# Patient Record
Sex: MALE | Race: WHITE | Employment: OTHER | URBAN - METROPOLITAN AREA
[De-identification: names, ages, dates, MRNs, and addresses within clinical notes are randomized per-mention and may not be internally consistent; named-entity substitution may affect disease eponyms.]

---

## 2021-11-04 ENCOUNTER — HOSPITAL ENCOUNTER (OUTPATIENT)
Dept: PHYSICAL THERAPY | Age: 61
Discharge: HOME | End: 2021-11-04
Payer: MEDICARE

## 2021-11-04 PROCEDURE — 97162 PT EVAL MOD COMPLEX 30 MIN: CPT

## 2021-11-04 PROCEDURE — 97530 THERAPEUTIC ACTIVITIES: CPT

## 2021-11-04 NOTE — PROGRESS NOTES
PT DAILY TREATMENT NOTE/NEURO EVAL 10-18    Patient Name: Shawn Barraza  Date:2021  : 1960  [x]  Patient  Verified  Payor: Myla Hudson / Plan: [address] [place] [address] HMO / Product Type: Managed Care Medicare /    In time:245  Out time:330  Total Treatment Time (min): 45  Visit #: 1 of 12    Medicare/BCBS Only   Total Timed Codes (min):  15 1:1 Treatment Time:  30     Treatment Area: Other abnormalities of gait and mobility [R26.89]    SUBJECTIVE  Pain Level (0-10 scale): 3/10   [x]constant [x]intermittent []improving []worsening []no change since onset    Any medication changes, allergies to medications, adverse drug reactions, diagnosis change, or new procedure performed?: [x] No    [] Yes (see summary sheet for update)  Subjective functional status/changes:     PLOF: Pt reports PLOF was ambulatory   Limitations to PLOF:  Pt report drop foot, limiting your ability to participate in his daily work out. Likes to go hunting. Mechanism of Injury: Pt reports that he has MS and has drop foot in Right foot. Pt has an AFO that is hinged and bulky. Pt reports that he needs a new AFO. Pt reports that his legs have been giving out on him and his legs get tired really fast and are giving out on him. Pt has an infusion at the end of the month. Pt reports that he has stumbled about 2-3 x a week mostly from catching his toe on stuff. Current symptoms/Complaints: Pt report drop foot, limiting your ability to participate in his daily work out. Previous Treatment/Compliance: Previous AFO, Infusions, gabapentin, aleve   PMHx/Surgical Hx: Partial TKA Right; Lumbar surgery, 2 budging discs L 3-5?;    Work Hx: Disabled. Living Situation: Lives in a 1 story home with no RAFY.     Pt Goals: Want to see if he can strengthen his legs if he could  Barriers: []pain []financial []time []transportation []other  Motivation: good   Substance use: []Alcohol []Tobacco []other:   Cognition: A & O x 3 Other: OBJECTIVE     30 min [x]Eval                  []Re-Eval     15 min Therapeutic Activity:  []  See flow sheet : tone and self management at home, MS symptom exacerbation   Rationale: education  to improve the patients ability to manage MS symptoms         With   [] TE   [x] TA   [] neuro   [] other: Patient Education: [x] Review HEP    [x] Progressed/Changed HEP based on:   [x] positioning   [x] body mechanics   [x] transfers   [] heat/ice application    [] other:        General Evaluation    Posture: [] Poor    [x] Fair    [] Good    Describe:    Slightly protracted shoulders slightly increased upper traps, good pelvic alignment. Seated resting posture Pt sits in hip internal rotation and plantar flexed on Right LE. Gait: [] Normal    [x] Abnormal    Device:  None     Description: slight IR and knee extension     ROM/Strength           AROM                         PROM          Strength (1-5)    Lower Extremity/Lumbar Left  (0-5) Right (0-5)   Hip Flexion (L1,2) 4 4   Knee Extension (L3,4) 5 5   Ankle Dorsiflexion (L4) 4 4   Hip extension  3+ 3+   Knee Flexion (S1,2) 4 4   Hip Abduction 3+ 3+     Tone: Clonus  Bala Ankles     Sensation: Pt reports that he is tingling from knees down Bala, intermittent numbness in legs and hands that go numb so he cant use them    Balance/ Equilibrium:         Single Leg Stance:         Eyes Open  Eyes Closed   Left 20 Left    Right 15 Right       Other test /comments:  TU.8 sec   MiniBESTest:     Pain Level (0-10 scale) post treatment: 3/10    ASSESSMENT/Changes in Function: 60 yo male who [facility] to [facility] with c/o unsteadiness on feet. Patient reports referral to therapy due to need for a new AFO for Right LE. Pt reports that he has MS and has drop foot in Right foot. Pt has an AFO that is hinged and bulky. Pt reports that his legs have been giving out on him and his legs get tired really fast and are giving out on him more frequently.   Pt has an infusion at the end of the month and reports that his symptoms increase when he nears his infusion. Pt reports that he has stumbled about 2-3 x a week mostly from catching his toe on stuff. Patient demonstrates decreased ROM, decreased strength, impaired posture, pain, impaired gait mechanics, impaired balance and stabitliy, and decreased functional mobility tolerance. Patient will continue to benefit from skilled PT services to modify and progress therapeutic interventions, address functional mobility deficits, address ROM deficits, address strength deficits, analyze and cue movement patterns, analyze and modify body mechanics/ergonomics, assess and modify postural abnormalities, address imbalance/dizziness and instruct in home and community integration to attain remaining goals. [x]  See Plan of Care  []  See progress note/recertification  []  See Discharge Summary         Progress towards goals / Updated goals:  Short Term Goals: To be accomplished in 2 weeks:  1. Patient will report compliance with HEP at least 1x/day to aid in rehabilitation program.  Status at IE: To be given at visit 2  Current:    2. Patient will be able to stand on toes 3 seconds at full ROM to decreased falls risk. Status at IE: partial ROM for 3 min  Current:       Long Term Goals: To be accomplished in 6 weeks:  1. Patient will increase strength to 5/5 throughout Bala LEs to aid in return recreational activities and ADLs. Status at IE:   Lower Extremity/Lumbar Left  (0-5) Right (0-5)   Hip Flexion (L1,2) 4 4   Knee Extension (L3,4) 5 5   Ankle Dorsiflexion (L4) 4 4   Hip extension  3+ 3+   Knee Flexion (S1,2) 4 4   Hip Abduction 3+ 3+     Current:    2. Patient will improve minibestest score to 25/28 to demonstrate decreased risk for falls. Status at IE: 19/28  Current:    3. Patient will ambulate 1000ft on level surface with normalized gait over even and uneven terrain with no LOB.   Status at [facility] [address] internal rotation of hip with plantar flexion on right during gait   Current:    4. Patient will improve FOTO by full points of change overall to demonstrate improvement in functional ability.   Status at IE:to be taken at visit 2   FOTO score = an established functional score where 100 = no disability  Current:    PLAN  []  Upgrade activities as tolerated     [x]  Continue plan of care  []  Update interventions per flow sheet       []  Discharge due to:_  []  Other:_      Kacy Gates, PT 11/4/2021  10:51 AM

## 2021-11-04 NOTE — PROGRESS NOTES
[facility]  [address] Merly Carlos, [address]  Ph [phone]  Fx [phone]    Plan of Care/ Statement of Necessity for Physical Therapy Services    Patient name: Batshvea Garza Start of Care: 2021   Referral source: Rossy oTscano : 1960    Medical Diagnosis: Other abnormalities of gait and mobility [R26.89]   Onset Date:>1 year     Treatment Diagnosis: Other abnormalities of gait and mobility                                              ICD-10: R26.89   Prior Hospitalization: see medical history Provider#: [id number]   Medications: Verified on Patient summary List    Comorbidities:  Partial TKA Right; Lumbar surgery, 2 budging discs L 3-5?;     Prior Level of Function: ambulatory going to the gym and hunting; no AD       The Plan of Care and following information is based on the information from the initial evaluation. Assessment/ key information: 58 yo male who presents to [facility] with c/o unsteadiness on feet. Patient reports referral to therapy due to need for a new AFO for Right LE. Pt reports that he has MS and has drop foot in Right foot. Pt has an AFO that is hinged and bulky. Pt reports that his legs have been giving out on him and his legs get tired really fast and are giving out on him more frequently. Pt has an infusion at the end of the month and reports that his symptoms increase when he nears his infusion. Pt reports that he has stumbled about 2-3 x a week mostly from catching his toe on stuff.   Patient demonstrates decreased ROM, decreased strength, impaired posture, pain, impaired gait mechanics, impaired balance and stabitliy, and decreased functional mobility tolerance.     Patient will continue to benefit from skilled PT services to modify and progress therapeutic interventions, address functional mobility deficits, address ROM deficits, address strength deficits, analyze and cue movement patterns, analyze and modify body mechanics/ergonomics, assess and modify postural abnormalities, address imbalance/dizziness and instruct in home and community integration to attain remaining goals. Evaluation Complexity History HIGH Complexity :3+ comorbidities / personal factors will impact the outcome/ POC ; Examination HIGH Complexity : 4+ Standardized tests and measures addressing body structure, function, activity limitation and / or participation in recreation  ;Presentation MEDIUM Complexity : Evolving with changing characteristics  Overall Complexity Rating: MEDIUM    Problem List: pain affecting function, decrease ROM, decrease strength, edema affecting function, impaired gait/ balance, decrease ADL/ functional abilitiies, decrease activity tolerance, decrease flexibility/ joint mobility and decrease transfer abilities   Treatment Plan may include any combination of the following: Therapeutic exercise, Therapeutic activities, Neuromuscular re-education, Physical agent/modality, Gait/balance training, Manual therapy, Patient education, Self Care training, Functional mobility training, Home safety training and Stair training  Patient / Family readiness to learn indicated by: asking questions, trying to perform skills and interest  Persons(s) to be included in education: patient (P)  Barriers to Learning/Limitations: None   Measures taken if barriers to learning: na  Patient Goal (s): Want to see if he can strengthen his legs if he could  Patient Self Reported Health Status: good  Rehabilitation Potential: good    Short Term Goals: To be accomplished in 2 weeks:  1. Patient will report compliance with HEP at least 1x/day to aid in rehabilitation program.  Status at IE: To be given at visit 2  Current:     2. Patient will be able to stand on toes 3 seconds at full ROM to decreased falls risk. Status at IE: partial ROM for 3 min  Current:        Long Term Goals: To be accomplished in 6 weeks:  1.  Patient will increase strength to 5/5 throughout Bala LEs to aid in return recreational activities and ADLs. Status at IE:   Lower Extremity/Lumbar Left  (0-5) Right (0-5)   Hip Flexion (L1,2) 4 4   Knee Extension (L3,4) 5 5   Ankle Dorsiflexion (L4) 4 4   Hip extension  3+ 3+   Knee Flexion (S1,2) 4 4   Hip Abduction 3+ 3+      Current:     2. Patient will improve minibestest score to 25/28 to demonstrate decreased risk for falls. Status at IE: 19/28  Current:     3. Patient will ambulate 1000ft on level surface with normalized gait over even and uneven terrain with no LOB. Status at IE:slight internal rotation of hip with plantar flexion on right during gait   Current:     4. Patient will improve FOTO by full points of change overall to demonstrate improvement in functional ability. Status at IE:to be taken at visit 2   FOTO score = an established functional score where 100 = no disability  Current:    Frequency / Duration: Patient to be seen 2 times per week for 6 weeks. Patient/ Caregiver education and instruction: Diagnosis, prognosis, self care, activity modification and exercises   [x]  Plan of care has been reviewed with PTA    Certification Period: 11/4/21-2/2/21    Gabriella Collier, PT 11/4/2021 10:50 AM    ________________________________________________________________________    I certify that the above Therapy Services are being furnished while the patient is under my care. I agree with the treatment plan and certify that this therapy is necessary.     Physician's Signature:_____________________Date:____________TIME:________                                      Dwyane Door*      ** Signature, Date and Time must be completed for valid certification **  Please sign and return to [facility] at THE [address] Archie Lopez, [address] [facility]  Ph [phone]  Fx [phone]

## 2021-11-09 ENCOUNTER — HOSPITAL ENCOUNTER (OUTPATIENT)
Dept: PHYSICAL THERAPY | Age: 61
Discharge: HOME | End: 2021-11-09
Payer: MEDICARE

## 2021-11-09 PROCEDURE — 97112 NEUROMUSCULAR REEDUCATION: CPT

## 2021-11-09 PROCEDURE — 97110 THERAPEUTIC EXERCISES: CPT

## 2021-11-09 NOTE — PROGRESS NOTES
PT DAILY TREATMENT NOTE    Patient Name: Renae Virgen  Date:2021  : 1960  [x]  Patient  Verified  Payor: Dipak Ikerks / Plan: [address] [place] [address] HMO / Product Type: Managed Care Medicare /    In time:500  Out time:545  Total Treatment Time (min): 45  Total Timed Codes (min): 38  1:1 Treatment Time (MC/BCBS only): 38  Visit #: 2 of 12    Treatment Dx: Other abnormalities of gait and mobility [R26.89]    SUBJECTIVE  Pain Level (0-10 scale): 5  Any medication changes, allergies to medications, adverse drug reactions, diagnosis change, or new procedure performed?: [x] No    [] Yes (see summary sheet for update)  Subjective functional status/changes:   [] No changes reported  Pt reported that he has talked to the orthotist and has to send him his schedule for fitting. OBJECTIVE    23 Min Therapeutic Exercise:  [x] See flow sheet :    Rationale: increase ROM, increase strength, improve coordination, improve balance and increase proprioception to improve the patients ability to restore PLOF    15 min Neuromuscular Re-education:  [x]  See flow sheet :   Rationale: increase ROM, increase strength, improve coordination, improve balance and increase proprioception  to improve the patients ability to activate gluts and anterior tib without compensation        With   [x] TE   [] TA   [x] neuro   [] other: Patient Education: [x] Review HEP    [x] Progressed/Changed HEP based on:   [x] positioning   [x] body mechanics   [] transfers   [] heat/ice application    [] other:    Pain Level (0-10 scale) post treatment: 0/10    ASSESSMENT/Changes in Function: Patient tolerated treatment session well today. Patient had no complaints with addition of standing balance exercises, strengthening for hip flexors and dorsiflexors as well as gluts to exercise program to accomplish improved balance and stepping strategy. Pt reports that he has not given the orthotist his schedule for PT yet.   Patient continues to make steady progress toward goals and would benefit from continued skilled PT intervention to address remaining deficits outlined in goals below. Patient will continue to benefit from skilled PT services to modify and progress therapeutic interventions, address functional mobility deficits, address ROM deficits, address strength deficits, analyze and address soft tissue restrictions, analyze and cue movement patterns, analyze and modify body mechanics/ergonomics, assess and modify postural abnormalities, address imbalance/dizziness and instruct in home and community integration to attain remaining goals. [x]  See Plan of Care  []  See progress note/recertification  []  See Discharge Summary         Progress towards goals / Updated goals:  Short Term Goals: To be accomplished in 2 weeks:  1. Patient will report compliance with HEP at least 1x/day to aid in rehabilitation program.  Status at IE: To be given at visit 2  Current: Given today. 11/9/21 PROGRESSING      2. Patient will be able to stand on toes 3 seconds at full ROM to decreased falls risk. Status at IE: partial ROM for 3 min  Current:        Long Term Goals: To be accomplished in 6 weeks:  1. Patient will increase strength to 5/5 throughout Bala LEs to aid in return recreational activities and ADLs. Status at IE:   Lower Extremity/Lumbar Left  (0-5) Right (0-5)   Hip Flexion (L1,2) 4 4   Knee Extension (L3,4) 5 5   Ankle Dorsiflexion (L4) 4 4   Hip extension  3+ 3+   Knee Flexion (S1,2) 4 4   Hip Abduction 3+ 3+      Current:     2. Patient will improve minibestest score to 25/28 to demonstrate decreased risk for falls. Status at IE: 19/28  Current:     3. Patient will ambulate 1000ft on level surface with normalized gait over even and uneven terrain with no LOB.   Status at IE:slight internal rotation of hip with plantar flexion on right during gait   Current:     4. Patient will improve FOTO by full points of change overall to demonstrate improvement in functional ability. Status at IE:to be taken at visit 2   FOTO score = an established functional score where 100 = no disability  Current: 60 GOAL 59 LESS THAN CURRENT SCORE. TESTING THROWN OUT. DC GOAL INAPPROPRIATE.  11/9/21     PLAN  [x]  Upgrade activities as tolerated     [x]  Continue plan of care  [x]  Update interventions per flow sheet       []  Discharge due to:_  []  Other:_      Mallory Lindsey, PT 11/9/2021  2:39 PM    Future Appointments   Date Time Provider Lita Swift   11/9/2021  5:00 PM [place] Meter, [address] THE [facility]   11/12/2021  3:30 PM Elizabeth Mariee [facility]   11/15/2021  4:15 PM Christobal Retort, PT [facility]   11/17/2021  4:15 PM Christobal Retort, PT [facility]   11/22/2021 12:30 PM [place] Meter, PT [facility]   11/26/2021  2:45 PM Christobal Retort, PT [facility]   12/1/2021  3:30 PM [place] Meter, [address] [facility]   12/3/2021  2:00 PM [place] Meter, PT [facility]

## 2021-11-12 ENCOUNTER — HOSPITAL ENCOUNTER (OUTPATIENT)
Dept: PHYSICAL THERAPY | Age: 61
Discharge: HOME | End: 2021-11-12
Payer: MEDICARE

## 2021-11-12 PROCEDURE — 97530 THERAPEUTIC ACTIVITIES: CPT

## 2021-11-12 PROCEDURE — 97112 NEUROMUSCULAR REEDUCATION: CPT

## 2021-11-12 PROCEDURE — 97110 THERAPEUTIC EXERCISES: CPT

## 2021-11-12 NOTE — PROGRESS NOTES
PT DAILY TREATMENT NOTE    Patient Name: Janine Rachel  Date:2021  : 1960  [x]  Patient  Verified  Payor: Kamilla Nunez / Plan: [address] [place] [address] HMO / Product Type: Managed Care Medicare /    In time:328  Out time:415  Total Treatment Time (min): 47  Total Timed Codes (min): 47  1:1 Treatment Time (MC/BCBS only):47   Visit #: 3 of 12    Treatment Dx: Other abnormalities of gait and mobility [R26.89]    SUBJECTIVE  Pain Level (0-10 scale): 2/10  Any medication changes, allergies to medications, adverse drug reactions, diagnosis change, or new procedure performed?: [x] No    [] Yes (see summary sheet for update)  Subjective functional status/changes:   [] No changes reported  PT reported that from knees down his legs were numb and it has gotten better since then. Pt reported that Orthotist is going to come on Monday.       OBJECTIVE    15 min Therapeutic Exercise:  [x] See flow sheet :   Rationale: increase ROM, increase strength, improve coordination, improve balance and increase proprioception to improve the patients ability to restore PLOF    15 min Therapeutic Activity:  [x]  See flow sheet :   Rationale: increase ROM, increase strength, improve coordination, improve balance, increase proprioception and pain control  to improve the patients ability to complete transfers and ADLs without external assist     17 min Neuromuscular Re-education:  [x]  See flow sheet :   Rationale: increase ROM, increase strength, improve coordination, improve balance and increase proprioception  to improve the patients ability to activate ankle and hip stabilizers   without compensation          With   [x] TE   [x] TA   [x] neuro   [] other: Patient Education: [x] Review HEP    [x] Progressed/Changed HEP based on:   [] positioning   [] body mechanics   [] transfers   [] heat/ice application    [] other:      Pain Level (0-10 scale) post treatment: 2/10    ASSESSMENT/Changes in Function: Patient tolerated treatment session well today. Patient had no complaints with addition of SLR to exercise program to accomplish improved quad control as pt reported that sometimes his knee nayeli on him. Pts orthotist will be coming on Monday to fit orthotic. Patient continues to make steady progress toward goals and would benefit from continued skilled PT intervention to address remaining deficits outlined in goals below. Patient will continue to benefit from skilled PT services to modify and progress therapeutic interventions, address functional mobility deficits, address ROM deficits, address strength deficits, analyze and address soft tissue restrictions, analyze and cue movement patterns, analyze and modify body mechanics/ergonomics, assess and modify postural abnormalities, address imbalance/dizziness and instruct in home and community integration to attain remaining goals. [x]  See Plan of Care  []  See progress note/recertification  []  See Discharge Summary         Progress towards goals / Updated goals:  Short Term Goals: To be accomplished in 2 weeks:  1. Patient will report compliance with HEP at least 1x/day to aid in rehabilitation program.  Status at IE: To be given at visit 2  Current: Given today. 11/9/21 PROGRESSING      2. Patient will be able to stand on toes 3 seconds at full ROM to decreased falls risk. Status at IE: partial ROM for 3 min  Current:        Long Term Goals: To be accomplished in 6 weeks:  1. Patient will increase strength to 5/5 throughout Bala LEs to aid in return recreational activities and ADLs. Status at IE:   Lower Extremity/Lumbar Left  (0-5) Right (0-5)   Hip Flexion (L1,2) 4 4   Knee Extension (L3,4) 5 5   Ankle Dorsiflexion (L4) 4 4   Hip extension  3+ 3+   Knee Flexion (S1,2) 4 4   Hip Abduction 3+ 3+      Current:     2. Patient will improve minibestest score to 25/28 to demonstrate decreased risk for falls. Status at IE: 19/28  Current:     3.  Patient will ambulate 1000ft on level surface with normalized gait over even and uneven terrain with no LOB. Status at IE:slight internal rotation of hip with plantar flexion on right during gait   Current:     4. Patient will improve FOTO by full points of change overall to demonstrate improvement in functional ability. Status at IE:to be taken at visit 2   FOTO score = an established functional score where 100 = no disability  Current: 60 GOAL 59 LESS THAN CURRENT SCORE. TESTING THROWN OUT. DC GOAL INAPPROPRIATE.  11/9/21    PLAN  [x]  Upgrade activities as tolerated     [x]  Continue plan of care  [x]  Update interventions per flow sheet       []  Discharge due to:_  []  Other:_      Gayatri Michaud PT 11/12/2021  3:27 PM    Future Appointments   Date Time Provider Lita Swift   11/12/2021  3:30 PM Marc Barr, [facility]   11/15/2021  4:15 PM Milady Haas, [facility]   11/18/2021  3:30 PM Milady Haas, [facility]   11/22/2021 12:30 PM Marc Barr, [facility]   11/26/2021  2:45 PM Milady Haas, [facility]   12/1/2021  3:30 PM Marc Barr, [facility] [facility]   12/3/2021  2:00 PM Marc Barr, [facility]

## 2021-11-15 ENCOUNTER — HOSPITAL ENCOUNTER (OUTPATIENT)
Dept: PHYSICAL THERAPY | Age: 61
Discharge: HOME | End: 2021-11-15
Payer: MEDICARE

## 2021-11-15 PROCEDURE — 97535 SELF CARE MNGMENT TRAINING: CPT

## 2021-11-15 PROCEDURE — 97112 NEUROMUSCULAR REEDUCATION: CPT

## 2021-11-15 PROCEDURE — 97110 THERAPEUTIC EXERCISES: CPT

## 2021-11-15 PROCEDURE — 97530 THERAPEUTIC ACTIVITIES: CPT

## 2021-11-15 NOTE — PROGRESS NOTES
PT DAILY TREATMENT NOTE    Patient Name: Jennifer Albarran  Date:11/15/2021  : 1960  [x]  Patient  Verified  Payor: Magda Chahal / Plan: [address] [place] [address] HMO / Product Type: Managed Care Medicare /    In time:4:15  Out time:5:10  Total Treatment Time (min): 55  Total Timed Codes (min): 55  1:1 Treatment Time (MC/BCBS only): 55   Visit #: 4 of 12    Treatment Dx: Other abnormalities of gait and mobility [R26.89]    SUBJECTIVE  Pain Level (0-10 scale): 210  Any medication changes, allergies to medications, adverse drug reactions, diagnosis change, or new procedure performed?: [x] No    [] Yes (see summary sheet for update)  Subjective functional status/changes:   [] No changes reported  \"I have the normal little pain. \"    OBJECTIVE      20 min Therapeutic Exercise:  [x] See flow sheet :   Rationale: increase ROM, increase strength and improve coordination to improve the patients ability to return to prior level of physical activity. 15 min Therapeutic Activity:  [x]  See flow sheet : Orthotic fitting and demo   Rationale: increase ROM, increase strength and improve coordination  to improve the patients ability to return to prior level of physical activity. 10 min Neuromuscular Re-education:  [x]  See flow sheet : steppage and gait mechanics with new orthotic   Rationale: increase ROM, increase strength and improve coordination  to improve the patients ability to return to prior level of physical activity. 10 min Self Care: Orthotic care and management   Rationale:    increase ROM, increase strength and improve coordination to improve the patients ability to return to prior level of physical activity.            With   [] TE   [] TA   [] neuro   [] other: Patient Education: [x] Review HEP    [] Progressed/Changed HEP based on:   [] positioning   [] body mechanics   [] transfers   [] heat/ice application    [] other:      Other Objective/Functional Measures:      Pain Level (0-10 scale) post treatment: 0/10    ASSESSMENT/Changes in Function: The orthotist arrived to fit pt with the new AFO. Pt demonstrated greatly improved tolerance to stair ambulation and general toe clearance with stepping up and over mechanics. Pt reported improved decrease in challenged with general ambulation and stepping up. Pt reported increased fatigue with ex but not above tolerance. Patient will continue to benefit from skilled PT services to modify and progress therapeutic interventions, address functional mobility deficits, address ROM deficits, address strength deficits, analyze and address soft tissue restrictions, analyze and cue movement patterns, analyze and modify body mechanics/ergonomics and assess and modify postural abnormalities to attain remaining goals. [x]  See Plan of Care  []  See progress note/recertification  []  See Discharge Summary         Progress towards goals / Updated goals:  Short Term Goals: To be accomplished in 2 weeks:  1. Patient will report compliance with HEP at least 1x/day to aid in rehabilitation program.  Status at IE: To be given at visit 2  Current: Pt had orthotist visit on visit 4 and was able to demonstrate improved toe clearance with general therex and HEP 11/15/21     2. Patient will be able to stand on toes 3 seconds at full ROM to decreased falls risk. Status at IE: partial ROM for 3 min  Current:        Long Term Goals: To be accomplished in 6 weeks:  1. Patient will increase strength to 5/5 throughout [place] LEs to aid in return recreational activities and ADLs. Status at IE:   Lower Extremity/Lumbar Left  (0-5) Right (0-5)   Hip Flexion (L1,2) 4 4   Knee Extension (L3,4) 5 5   Ankle Dorsiflexion (L4) 4 4   Hip extension  3+ 3+   Knee Flexion (S1,2) 4 4   Hip Abduction 3+ 3+      Current:     2. Patient will improve minibestest score to 25/28 to demonstrate decreased risk for falls. Status at IE: 19/28  Current:     3.  Patient will ambulate 1000ft on level surface with normalized gait over even and uneven terrain with no LOB. Status at IE:slight internal rotation of hip with plantar flexion on right during gait   Current:     4. Patient will improve FOTO by full points of change overall to demonstrate improvement in functional ability. Status at IE:to be taken at visit 2   FOTO score = an established functional score where 100 = no disability  Current: 60 GOAL 59 LESS THAN CURRENT SCORE.  TESTING THROWN OUT.  DC GOAL INAPPROPRIATE.  11/9/21        PLAN  [x]  Upgrade activities as tolerated     [x]  Continue plan of care  []  Update interventions per flow sheet       []  Discharge due to:_  []  Other:_      Deann Harris, PTA 11/15/2021  4:30 PM    Future Appointments   Date Time Provider Lita Swift   11/18/2021  3:30 PM Sabrina Litten, [address] [facility] [employer] [facility]   11/22/2021 12:30 PM Claudia Zuñiga PT [facility]   11/26/2021  2:45 PM Sabrina Litten, PT [facility]   12/1/2021  3:30 PM Claudia Zuñiga, [address] [facility] [employer] [facility]   12/3/2021  2:00 PM Claudia Zuñiga PT [facility]

## 2021-11-17 ENCOUNTER — APPOINTMENT (OUTPATIENT)
Dept: PHYSICAL THERAPY | Age: 61
End: 2021-11-17
Payer: MEDICARE

## 2021-11-18 ENCOUNTER — HOSPITAL ENCOUNTER (OUTPATIENT)
Dept: PHYSICAL THERAPY | Age: 61
Discharge: HOME | End: 2021-11-18
Payer: MEDICARE

## 2021-11-18 PROCEDURE — 97110 THERAPEUTIC EXERCISES: CPT

## 2021-11-18 PROCEDURE — 97112 NEUROMUSCULAR REEDUCATION: CPT

## 2021-11-18 PROCEDURE — 97116 GAIT TRAINING THERAPY: CPT

## 2021-11-18 NOTE — PROGRESS NOTES
PT DAILY TREATMENT NOTE    Patient Name: Alicia Mares  Date:2021  : 1960  [x]  Patient  Verified  Payor: Jack Khoury / Plan: [address] [place] [address] HMO / Product Type: Managed Care Medicare /    In time:330  Out time:415  Total Treatment Time (min): 45  Total Timed Codes (min): 45  1:1 Treatment Time (MC/BCBS only): 45   Visit #: 5 of 12    Treatment Dx: Other abnormalities of gait and mobility [R26.89]    SUBJECTIVE  Pain Level (0-10 scale): 2/10  Any medication changes, allergies to medications, adverse drug reactions, diagnosis change, or new procedure performed?: [x] No    [] Yes (see summary sheet for update)  Subjective functional status/changes:   [] No changes reported  Patient reported being very happy with his new AFO. OBJECTIVE      15 min Therapeutic Exercise:  [x] See flow sheet :   Rationale: increase ROM, increase strength and improve coordination to improve the patients ability to ambulate with less deviation         22 min Neuromuscular Re-education:  [x]  See flow sheet : MiniBESTest   Rationale: To assess patient's fall risk and to assess change with AFO and to increase balance to decrease fall risk      8 min Gait Training:  _168 and 336__ feet with _no__ device on level surfaces with _S__ level of assistance   Rationale: To improve ambulation safety and efficiency in order to improve patient's ability to safely ambulate at home for self care. With   [] TE   [] TA   [] neuro   [] other: Patient Education: [x] Review HEP    [] Progressed/Changed HEP based on:   [] positioning   [] body mechanics   [] transfers   [] heat/ice application    [] other:      Other Objective/Functional Measures: MiniBESTest =      Pain Level (0-10 scale) post treatment: 0/10    ASSESSMENT/Changes in Function: Patient tolerated treatment session well today. MiniBESTest was performed to assess pt's balance and change with use of his new AFO.   His score increased from  to 24/28. Patient continues to make steady progress toward goals and would benefit from continued skilled PT intervention to address remaining deficits outlined in goals below. Patient will continue to benefit from skilled PT services to modify and progress therapeutic interventions, address functional mobility deficits, address ROM deficits, address strength deficits, analyze and address soft tissue restrictions, analyze and cue movement patterns, analyze and modify body mechanics/ergonomics and instruct in home and community integration to attain remaining goals. [x]  See Plan of Care  []  See progress note/recertification  []  See Discharge Summary         Progress towards goals / Updated goals:  Short Term Goals: To be accomplished in 2 weeks:  1. Patient will report compliance with HEP at least 1x/day to aid in rehabilitation program.  Status at IE: To be given at visit 2  Current: Pt had orthotist visit on visit 4 and was able to demonstrate improved toe clearance with general therex and HEP 11/15/21     2. Patient will be able to stand on toes 3 seconds at full ROM to decreased falls risk. Status at IE: partial ROM for 3 min  Current:        Long Term Goals: To be accomplished in 6 weeks:  1. Patient will increase strength to 5/5 throughout [place] LEs to aid in return recreational activities and ADLs. Status at IE:   Lower Extremity/Lumbar Left  (0-5) Right (0-5)   Hip Flexion (L1,2) 4 4   Knee Extension (L3,4) 5 5   Ankle Dorsiflexion (L4) 4 4   Hip extension  3+ 3+   Knee Flexion (S1,2) 4 4   Hip Abduction 3+ 3+      Current:     2. Patient will improve minibestest score to 25/28 to demonstrate decreased risk for falls. Status at IE: 19/28  Current: 24/28 (11/18/21) Progressing     3. Patient will ambulate 1000ft on level surface with normalized gait over even and uneven terrain with no LOB.   Status at IE:slight internal rotation of hip with plantar flexion on right during gait   Current:     4. Patient will improve FOTO by full points of change overall to demonstrate improvement in functional ability. Status at IE:to be taken at visit 2   FOTO score = an established functional score where 100 = no disability  Current: 60 GOAL 59 LESS THAN CURRENT SCORE.  TESTING THROWN OUT.  DC GOAL INAPPROPRIATE.  11/9/21           PLAN  []  Upgrade activities as tolerated     [x]  Continue plan of care  []  Update interventions per flow sheet       []  Discharge due to:_  []  Other:_      Delia Poe PT 11/18/2021  3:31 PM    Future Appointments   Date Time Provider Lita Swift   11/22/2021 12:30 PM [facility]   11/26/2021  2:45 PM Manuel Dimas PT [facility]   12/1/2021  3:30 PM Alba Jang, [address] [place] [address]   12/3/2021  2:00 PM Alba Jang PT [facility]

## 2021-11-22 ENCOUNTER — APPOINTMENT (OUTPATIENT)
Dept: PHYSICAL THERAPY | Age: 61
End: 2021-11-22
Payer: MEDICARE

## 2021-11-26 ENCOUNTER — HOSPITAL ENCOUNTER (OUTPATIENT)
Dept: PHYSICAL THERAPY | Age: 61
Discharge: HOME | End: 2021-11-26
Payer: MEDICARE

## 2021-11-26 PROCEDURE — 97110 THERAPEUTIC EXERCISES: CPT

## 2021-11-26 PROCEDURE — 97112 NEUROMUSCULAR REEDUCATION: CPT

## 2021-11-26 PROCEDURE — 97116 GAIT TRAINING THERAPY: CPT

## 2021-11-26 NOTE — PROGRESS NOTES
PT DAILY TREATMENT NOTE    Patient Name: Renae Virgen  Date:2021  : 1960  [x]  Patient  Verified  Payor: Dipak Ikerks / Plan: [address] [place] [address] HMO / Product Type: Managed Care Medicare /    In time:247  Out time:329  Total Treatment Time (min): 47  Total Timed Codes (min): 45  1:1 Treatment Time (MC/BCBS only): 45   Visit #: 6 of 12    Treatment Dx: Other abnormalities of gait and mobility [R26.89]    SUBJECTIVE  Pain Level (0-10 scale): 3/10  Any medication changes, allergies to medications, adverse drug reactions, diagnosis change, or new procedure performed?: [x] No    [] Yes (see summary sheet for update)  Subjective functional status/changes:   [] No changes reported  Patient reported increase in LBP due to standing and cooking yesterday. Patient reported not using his brace yesterday. OBJECTIVE    17 min Therapeutic Exercise:  [x] See flow sheet :   Rationale: increase ROM, increase strength and improve coordination to improve the patients ability to perform tasks of daily living with increased ease. 8 min Neuromuscular Re-education:  []  See flow sheet : tandem gait   Rationale: improve coordination, improve balance and increase proprioception  to improve the patients ability to prevent a fall      20 min Gait Trainin feet with AFO device on level surfaces with CGA/close SBA level of assistance posterior  And posterior lateral resistance. Cognitive task was performed while walking. Side steps and side step over obstacle were performed. Rationale: To improve ambulation safety and efficiency in order to improve patient's ability to safely ambulate at home for self care.                With   [] TE   [] TA   [] neuro   [] other: Patient Education: [x] Review HEP    [] Progressed/Changed HEP based on:   [] positioning   [] body mechanics   [] transfers   [] heat/ice application    [] other:         Pain Level (0-10 scale) post treatment: 0/10    ASSESSMENT/Changes in Function: Patient tolerated treatment session well today. Patient had no complaints with addition of step down and resisted walking to exercise program to accomplish increase quad strength and to decrease fall risk. Patient tolerated resisted walking with increased lateral displacement when a cognitive task was included. .  Patient continues to make steady  progress toward goals and would benefit from continued skilled PT intervention to address remaining deficits outlined in goals below. Patient will continue to benefit from skilled PT services to modify and progress therapeutic interventions, address functional mobility deficits, address ROM deficits, address strength deficits, analyze and address soft tissue restrictions, analyze and cue movement patterns, analyze and modify body mechanics/ergonomics, assess and modify postural abnormalities and instruct in home and community integration to attain remaining goals. [x]  See Plan of Care  []  See progress note/recertification  []  See Discharge Summary         Progress towards goals / Updated goals:  Short Term Goals: To be accomplished in 2 weeks:  1. Patient will report compliance with HEP at least 1x/day to aid in rehabilitation program.  Status at IE: To be given at visit 2  Current: Pt had orthotist visit on visit 4 and was able to demonstrate improved toe clearance with general therex and HEP 11/15/21     2. Patient will be able to stand on toes 3 seconds at full ROM to decreased falls risk. Status at IE: partial ROM for 3 min  Current:        Long Term Goals: To be accomplished in 6 weeks:  1. Patient will increase strength to 5/5 throughout [place] LEs to aid in return recreational activities and ADLs.   Status at IE:   Lower Extremity/Lumbar Left  (0-5) Right (0-5)   Hip Flexion (L1,2) 4 4   Knee Extension (L3,4) 5 5   Ankle Dorsiflexion (L4) 4 4   Hip extension  3+ 3+   Knee Flexion (S1,2) 4 4   Hip Abduction 3+ 3+    Current:     2. Patient will improve minibestest score to 25/28 to demonstrate decreased risk for falls. Status at IE: 19/28  Current: 24/28 (11/18/21) Progressing     3. Patient will ambulate 1000ft on level surface with normalized gait over even and uneven terrain with no LOB. Status at IE:slight internal rotation of hip with plantar flexion on right during gait   Current:     4. Patient will improve FOTO by full points of change overall to demonstrate improvement in functional ability. Status at IE:to be taken at visit 2   FOTO score = an established functional score where 100 = no disability  Current: 60 GOAL 59 LESS THAN CURRENT SCORE.  TESTING THROWN OUT.  DC GOAL INAPPROPRIATE.  11/9/21        PLAN  []  Upgrade activities as tolerated     [x]  Continue plan of care  []  Update interventions per flow sheet       []  Discharge due to:_  []  Other:_      Rubbie Lennox, PT 11/26/2021  11:18 AM    Future Appointments   Date Time Provider Lita Swift   11/26/2021  2:45 PM Alan Lassiter PT [facility]   12/1/2021  3:30 PM Sd Wang PT [facility]   12/3/2021  2:00 PM Sd Wang PT [facility]

## 2021-12-01 ENCOUNTER — APPOINTMENT (OUTPATIENT)
Dept: PHYSICAL THERAPY | Age: 61
End: 2021-12-01

## 2021-12-28 ENCOUNTER — HOSPITAL ENCOUNTER (OUTPATIENT)
Dept: NON INVASIVE DIAGNOSTICS | Age: 61
Discharge: HOME | End: 2021-12-28
Payer: MEDICARE

## 2021-12-28 ENCOUNTER — TRANSCRIBE ORDER (OUTPATIENT)
Dept: REGISTRATION | Age: 61
End: 2021-12-28

## 2021-12-28 DIAGNOSIS — Z01.818 OTHER SPECIFIED PRE-OPERATIVE EXAMINATION: Primary | ICD-10-CM

## 2021-12-28 DIAGNOSIS — Z01.818 OTHER SPECIFIED PRE-OPERATIVE EXAMINATION: ICD-10-CM

## 2021-12-28 PROCEDURE — 93005 ELECTROCARDIOGRAM TRACING: CPT

## 2021-12-29 ENCOUNTER — HOSPITAL ENCOUNTER (OUTPATIENT)
Dept: LAB | Age: 61
Discharge: HOME | End: 2021-12-29
Payer: MEDICARE

## 2021-12-29 LAB
ATRIAL RATE: 46 BPM
CALCULATED P AXIS, ECG09: 8 DEGREES
CALCULATED R AXIS, ECG10: -47 DEGREES
CALCULATED T AXIS, ECG11: 4 DEGREES
DIAGNOSIS, 93000: NORMAL
P-R INTERVAL, ECG05: 178 MS
Q-T INTERVAL, ECG07: 452 MS
QRS DURATION, ECG06: 112 MS
QTC CALCULATION (BEZET), ECG08: 395 MS
VENTRICULAR RATE, ECG03: 46 BPM

## 2021-12-29 PROCEDURE — 88305 TISSUE EXAM BY PATHOLOGIST: CPT

## 2021-12-29 PROCEDURE — 88304 TISSUE EXAM BY PATHOLOGIST: CPT

## 2022-01-28 NOTE — PROGRESS NOTES
[facility]  [address], [address] [facility]  Ph [phone]  Fx [phone]    Physical Therapy Discharge Summary    Patient name: Michelle Ryder Start of Care: 2021   Referral source: Nasra Benoit : 1960                Medical Diagnosis: Other abnormalities of gait and mobility [R26.89]    Onset Date:>1 year                Treatment Diagnosis: Other abnormalities of gait and mobility                                              ICD-10: R26.89   Prior Hospitalization: see medical history Provider#: [id number]   Medications: Verified on Patient summary List    Comorbidities:  Partial TKA Right; Lumbar surgery, 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 Patient will report compliance with HEP at least 1x/day to aid in rehabilitation program.  Status at IE: To be given at visit 2  Current: Pt had orthotist visit on visit 4 and was able to demonstrate improved toe clearance with general therex and HEP 11/15/21     2. Patient will be able to stand on toes 3 seconds at full ROM to decreased falls risk. Status at IE: partial ROM for 3 min  Current:        Long Term Goals: To be accomplished in 6 weeks:  1. Patient will increase strength to 5/5 throughout [place] LEs to aid in return recreational activities and ADLs. Status at IE:   Lower Extremity/Lumbar Left  (0-5) Right (0-5)   Hip Flexion (L1,2) 4 4   Knee Extension (L3,4) 5 5   Ankle Dorsiflexion (L4) 4 4   Hip extension  3+ 3+   Knee Flexion (S1,2) 4 4   Hip Abduction 3+ 3+      Current:     2. Patient will improve minibestest score to 25/28 to demonstrate decreased risk for falls. Status at IE:   Current:  (21) Progressing     3.  Patient will ambulate 1000ft on level surface with normalized gait over even and uneven terrain with no LOB. Status at IE:slight internal rotation of hip with plantar flexion on right during gait   Current:     4. Patient will improve FOTO by full points of change overall to demonstrate improvement in functional ability. Status at IE:to be taken at visit 2   FOTO score = an established functional score where 100 = no disability  Current: 60 GOAL 59 LESS THAN CURRENT SCORE.  TESTING THROWN OUT.  DC GOAL INAPPROPRIATE. 11/9/21    Assessment/ Summary of Care:  Unable to formally assess goals as pt failed to show for scheduled followup appts. Please see above for goals assessment while pt was current under the care of this clinic. Please DC to HEP at this time. Thank you for this referral. Pt will require new order if he/she requires further rehab services.     RECOMMENDATIONS:  [x]Discontinue therapy: []Patient has reached or is progressing toward set goals      [x]Patient is non-compliant or has abdicated      []Due to lack of appreciable progress towards set goals    Norma Leigh, PT 1/28/2022 8:36 AM